# Patient Record
(demographics unavailable — no encounter records)

---

## 2025-02-26 NOTE — HISTORY OF PRESENT ILLNESS
[TextBox_4] : 60-year-old man with history of hyperlipidemia, hypertension, acid reflux, diabetes, [place] exposure, asthma on Breo daily and albuterol as needed and well-controlled with no recent exacerbation, obstructive sleep apnea apparently on CPAP with recent sleep study done in [place], presenting for evaluation.

## 2025-02-26 NOTE — ASSESSMENT
[FreeTextEntry1] : Severe obstructive sleep apnea with an AHI of 55 Split-night study done recently CPAP was titrated to 14 cm of water At the pressure there was resolution of his obstructive sleep apnea He has been using the machine for about less than a month We will need 60 days of usage and that compliance data afterwards Follow-up in 1 month Sy Schwartz MD

## 2025-05-27 NOTE — ASSESSMENT
[FreeTextEntry1] : Severe obstructive sleep apnea with AHI of 55 at baseline Compliance data reviewed today He is on CPAP 14 cm of water with a nasal mask Average AHI while on treatment is 1 His compliance is adequate He is benefiting from the machine He is receiving supplies regularly from the DME company 6 months follow-up

## 2025-05-27 NOTE — HISTORY OF PRESENT ILLNESS
[TextBox_4] : 60-year-old man with history of hyperlipidemia, hypertension, acid reflux, diabetes, [place] exposure, asthma on Breo daily and albuterol as needed and well-controlled with no recent exacerbation, obstructive sleep apnea apparently on CPAP with recent sleep study done in [place], presenting for evaluation.  5/27/2025: CPAP compliance reviewed today.  His residual AHI is 0.91.  His compliance is almost 100%.  He is benefiting from the machine.  He however wakes up 2-3 times a night due to urination and this makes him somewhat sleepy in the day.

## 2025-06-10 NOTE — ASSESSMENT
[FreeTextEntry1] : Patient has swelling in his right forearm.  He does have carpal tunnel syndrome though carpal tunnel is not explaining the swelling in his right forearm.  He may have medial epicondylitis but that is not explaining his right forearm swelling and pain or discomfort.  I am unsure why he has the swelling it has been that way for greater than 1 year a significant evaluation and workup done without any resolution at 1 point did have an elevated rheumatoid factor I think an MRI of the forearm is warranted I think contrast would benefit the MRI to assist in diagnosis.  His last MRI showed swelling around the musculature.  I think MRI would give us further information.  He may require further diagnostic testing as well.  [facility] outside referrals as well.  Could back after the MRIs performed.  The MRI that he had done over a year ago did show abnormality within the forearm musculature but was nonspecific I think MRI with contrast would allow us to narrow down potential causes for his lymphedema

## 2025-06-10 NOTE — PHYSICAL EXAM
[de-identified] : There is an asymmetry within right forearm on the left forearm.  The right forearm is firmer than the left forearm.  He has exquisite tenderness to palpation on the medial epicondyle.  He has pain resisted forearm pronation.  He has no tenderness on the lateral epicondyle.  He does have bilateral Tinel signs and bilateral median nerve compression test.  Is good thenar strength without thenar atrophy.

## 2025-06-10 NOTE — HISTORY OF PRESENT ILLNESS
[de-identified] : 60-year-old male with chronic issues with his right arm.  Patient was evaluated over 1 year ago for his right arm.  He says he was admitted in the hospital they were questioning whether or not he had a cellulitis.  Patient does have a history of melanoma with lymph node dissection.  He had no swelling or lymphedema after that procedure.  He has been worked up with an EMG test from 2023 that documented carpal tunnel syndrome and he does report numbness and tingling in the fingers.  He reports an asymmetry on 1 forearm to the other forearm.  Right arm is larger.  pain discomfort in the right elbow.  Not report any shoulder pain or discomfort.  He had a venogram done that showed no evidence of thoracic outlet He had an ultrasound done of the forearm that showed swelling He had an MRI of the forearm that showed swelling with no mass  He has had no surgical procedures recently on the right arm.

## 2025-06-10 NOTE — DATA REVIEWED
[FreeTextEntry1] : Radiographs 3 views of his right forearm reviewed showing no fracture dislocation no destructive lesion

## 2025-07-01 NOTE — DATA REVIEWED
[FreeTextEntry1] : I reviewed the CAT scan results online.  There is no masses that I was able to visualize.

## 2025-07-01 NOTE — PHYSICAL EXAM
[de-identified] : Patient has not tender to palpation on the epicondyle right elbow.  Full motion.  His forearm certainly is larger on the right side than the left side.

## 2025-07-01 NOTE — HISTORY OF PRESENT ILLNESS
[de-identified] : 60-year-old male edema right forearm area.  Had an MRI done with contrast.  It came of very nonspecific findings.  Has BING fascial edema.  He has tenderness to palpation and pain on the medial side of the forearm.

## 2025-07-01 NOTE — ASSESSMENT
[FreeTextEntry1] : Patient has lymphedema right arm.  He also has possibility of some golfers elbow present.  The natural history of this condition was discussed with the patient.  The surgical results were discussed.  He said cortisone injections in the past does not want another one.  He will see me back on an as-needed basis.